# Patient Record
Sex: MALE | Race: WHITE | URBAN - METROPOLITAN AREA
[De-identification: names, ages, dates, MRNs, and addresses within clinical notes are randomized per-mention and may not be internally consistent; named-entity substitution may affect disease eponyms.]

---

## 2019-04-05 ENCOUNTER — HISTORICAL (OUTPATIENT)
Dept: ADMINISTRATIVE | Facility: HOSPITAL | Age: 69
End: 2019-04-05

## 2019-06-14 ENCOUNTER — HISTORICAL (OUTPATIENT)
Dept: ADMINISTRATIVE | Facility: HOSPITAL | Age: 69
End: 2019-06-14

## 2019-06-20 ENCOUNTER — HISTORICAL (OUTPATIENT)
Dept: ADMINISTRATIVE | Facility: HOSPITAL | Age: 69
End: 2019-06-20

## 2019-09-23 ENCOUNTER — HISTORICAL (OUTPATIENT)
Dept: ADMINISTRATIVE | Facility: HOSPITAL | Age: 69
End: 2019-09-23

## 2022-04-09 ENCOUNTER — HISTORICAL (OUTPATIENT)
Dept: ADMINISTRATIVE | Facility: HOSPITAL | Age: 72
End: 2022-04-09

## 2022-04-26 VITALS
WEIGHT: 222.69 LBS | OXYGEN SATURATION: 96 % | DIASTOLIC BLOOD PRESSURE: 84 MMHG | HEIGHT: 71 IN | SYSTOLIC BLOOD PRESSURE: 124 MMHG | BODY MASS INDEX: 31.18 KG/M2

## 2022-04-29 NOTE — OP NOTE
DATE OF SURGERY:    06/20/2019    SURGEON:  Michael Dickerson MD  ASSISTANT:  Kailee Cruz NP    PREOPERATIVE DIAGNOSIS:  Macula off retinal detachment of the right eye.    POSTOPERATIVE DIAGNOSIS:  Macula off retinal detachment of the right eye.    PROCEDURE:  Pars plana vitrectomy with scleral buckling, retinotomy, fluid air exchange, endolaser and silicone oil infusion all to the right eye.    ANESTHESIA:  General.    ESTIMATED BLOOD LOSS:  Less than 5 cc.    COMPLICATIONS:  None.    PROCEDURE INDICATIONS:  Mr. Moffett has a history of a macula off retinal detachment of the right eye requiring a vitrectomy and scleral buckling.    PROCEDURE DESCRIPTION:  The patient was taken to the operative theater and general anesthesia was begun.  The right eye was prepped and draped in normal sterile fashion and a lid speculum was applied.  A 360-degree conjunctival peritomy was created and all 4 rectus muscles were isolated with 2-0 silk ties.  Four partial thickness scleral belt loops were created in each quadrant of the globe, and the globe was encircled with a #42 silicone band which was connected superonasally with a #270 silicone sleeve.  This was tightened to provide a moderate amount of scleral indentation.  This was then followed by standard 3-port 25 gauge pars plana vitrectomy.  All trocars were placed 3.5 mm from the surgical limbus.  A core vitrectomy was performed including removal of posterior hyaloid out to the peripheral retina.  The patient was seen to have cryoretinopexy scar superotemporally from the previous pneumatic retinopexy.  He had additional retinal tears inferior temporally, inferior nasally and superonasally.  A nasal retinotomy was performed, and all subretinal fluid was aspirated through the retinotomy site.  Endolaser was used to treat the retinotomy, as well as the remaining retinal tears.  1 mg of Kenalog was injected into the vitreous cavity followed by 5,000 weight  silicone oil.  All instruments were removed from the eye and all sclerotomies were closed with 7-0 Vicryl suture.  The excess silicone was removed from the scleral buckle.  The 2-0 silk ties were removed.  The conjunctiva was reapproximated to the limbus and closed with 6-0 fast absorbing gut.  Several drops of TobraDex ophthalmic solution were applied to the eye.  The postoperative intraocular pressure was 15 mmHg.  The lid speculum and eye drape were then removed and the eye was covered with a gauze patch and a Deluna shield.  The patient was then transported to the postoperative care unit to recover.    DISCHARGE CONDITION:  Good.    DISPOSITION:  Home.    FOLLOWUP:  With Dr. Dickerson the following day.       This patient tolerated the procedure well.        ______________________________  MD MARCELLUS Spence/UM  DD:  06/20/2019  Time:  03:23PM  DT:  06/20/2019  Time:  03:46PM  Job #:  [id number]

## 2022-04-29 NOTE — OP NOTE
DATE OF SURGERY:        SURGEON:  Michael Dickerson MD    PREOPERATIVE DIAGNOSIS:  Macula-off retinal detachment of the right eye.    POSTOPERATIVE DIAGNOSIS:  Macula-off retinal detachment of the right eye.    PROCEDURE PERFORMED:  Pars plana vitrectomy with silicone oil removal to the right eye.    ANESTHESIA:  General.    ESTIMATED BLOOD LOSS:  Less than 5 cc.    COMPLICATIONS:  None.    INDICATIONS:  Mr. Moffett has a previous history of a retinal detachment repaired with a vitrectomy, scleral buckle, and silicone oil and now requires silicone oil removal.    DESCRIPTION OF PROCEDURE:  The patient was taken to the operative theater where general anesthesia was begun.  The right eye was prepped and draped in the normal sterile fashion and a lid speculum was applied.  A 25-gauge trocar was placed inferotemporally and an infusion cannula was placed in this location.  A superotemporal sclerotomy was created with an MVR blade and the silicone oil was extracted through the superotemporal sclerotomy with a silicone oil extraction handpiece without complication.  The retina was then examined and was seen to be completely attached with no remaining silicone oil.  The superotemporal sclerotomy was closed with 7-0 Vicryl suture and its conjunctiva closed with 6-0 fast-absorbing gut.  The inferotemporal trocar was removed and its sclerotomy massaged closed with a cotton-tip swab.  Several drops of TobraDex ophthalmic solution were applied to the eye.  The postoperative intraocular pressure was 15 mmHg.  The lid speculum and eye drape were then removed and the eye was covered with a gauze patch and a Deluna shield.  The patient was then transported to the postoperative care unit to recover.    DISCHARGE CONDITION:  Good.      DISPOSITION:  Home with followup with Dr. Dickerson the following day.  This patient tolerated the procedure well.        ______________________________  Michael Dickerson MD    RIB/SHAYY  DD:  09/23/2019   Time:  10:34AM  DT:  09/23/2019  Time:  10:59AM  Job #:  [id number]

## 2022-04-29 NOTE — OP NOTE
DATE OF SURGERY:        SURGEON:  Michael Dickerson MD    PREOPERATIVE DIAGNOSIS:  Macula-off superotemporal retinal detachment.    POSTOPERATIVE DIAGNOSIS:  Macula-off superotemporal retinal detachment.    PROCEDURE:  Pneumatic retinopexy to the right eye.    ANESTHESIA:  General.    ESTIMATED BLOOD LOSS:  Less than 5 cc.    COMPLICATIONS:  None.    PROCEDURE INDICATIONS:  Mr. Moffett has a history of a superotemporal macula-off retinal detachment of the right eye, with 3 superotemporal horseshoe tears.  He requires a pneumatic retinopexy.    PROCEDURE DESCRIPTION:  The patient was taken to the operative theater, where general anesthesia was begun.  The right eye was prepped and draped in the normal sterile fashion and a lid speculum was applied.  Cryoretinopexy was used to treat the 3 superior horseshoe tears, which were all within 2 clock hours of one another.  There were no other retinal tears identified.  A tuberculin syringe and a 30-gauge needle was used to create a corneal paracentesis, and 0.4 cc of aqueous fluid was aspirated.  This was followed by an intravitreal injection of 0.4 cc of pure C3F8 gas, done through the inferotemporal pars plana, 3.5 mm from the surgical limbus, without complication.  The central retinal artery was well perfused, and the postoperative pressure was 15 mmHg.  Several drops of TobraDex ophthalmic solution were applied to the eye.  The lid speculum and eye drape were then removed, and the eye was covered with a gauze patch.  The patient was transported to the postoperative care unit to recover.    DISCHARGE CONDITION:  Good.    DISPOSITION:  Home, with followup with Dr. Dickerson the following day.       This patient tolerated the procedure well.        ______________________________  Michael Dickerson MD    [facility]  DD:  07/19/2019  Time:  08:23AM  DT:  07/19/2019  Time:  08:35AM  Job #:  [id number]